# Patient Record
Sex: FEMALE | Race: WHITE | NOT HISPANIC OR LATINO | Employment: FULL TIME | RURAL
[De-identification: names, ages, dates, MRNs, and addresses within clinical notes are randomized per-mention and may not be internally consistent; named-entity substitution may affect disease eponyms.]

---

## 2021-01-25 ENCOUNTER — TELEMEDICINE (OUTPATIENT)
Dept: FAMILY MEDICINE CLINIC | Facility: CLINIC | Age: 26
End: 2021-01-25

## 2021-01-25 DIAGNOSIS — F40.9 INSOMNIA DUE TO ANXIETY AND FEAR: ICD-10-CM

## 2021-01-25 DIAGNOSIS — F51.05 INSOMNIA DUE TO ANXIETY AND FEAR: ICD-10-CM

## 2021-01-25 DIAGNOSIS — F41.8 SITUATIONAL ANXIETY: ICD-10-CM

## 2021-01-25 DIAGNOSIS — R53.83 FATIGUE, UNSPECIFIED TYPE: Primary | ICD-10-CM

## 2021-01-25 PROCEDURE — 99203 OFFICE O/P NEW LOW 30 MIN: CPT | Performed by: NURSE PRACTITIONER

## 2021-01-25 RX ORDER — BUSPIRONE HYDROCHLORIDE 15 MG/1
TABLET ORAL
Qty: 90 TABLET | Refills: 2 | Status: SHIPPED | OUTPATIENT
Start: 2021-01-25

## 2021-01-25 NOTE — PROGRESS NOTES
"Chief Complaint  Anxiety     You have chosen to receive care through a telehealth visit.  Do you consent to use a video/audio connection for your medical care today? 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  Due to the Pandemic, she is being pulled to needed departments within the hospital and it is causing significant situational anxiety.  This is further causing disturbed sleep patterns and awakening with feelings of \"fight or flight\".  She does not have a history of anxiety issues per se but did have carbon monoxide poisoning and has noted some generalized anxiety since that time.  She further notes that due to the interrupted and lack of sleep, she now is having significant fatigue to deal with as well.    Objective   Vital Signs:   There were no vitals taken for this visit.      Physical Exam   No true physical exam in this video encounter.    Result Review :                 Assessment and Plan    Problem List Items Addressed This Visit     None      Visit Diagnoses     Fatigue, unspecified type    -  Primary    Situational anxiety        Relevant Medications    busPIRone (BUSPAR) 15 MG tablet    Insomnia due to anxiety and fear        Relevant Medications    busPIRone (BUSPAR) 15 MG tablet        I spent 31 minutes caring for Michael on this date of service. This time includes time spent by me in the following activities:preparing for the visit, obtaining and/or reviewing a separately obtained history, performing a medically appropriate examination and/or evaluation , counseling and educating the patient/family/caregiver, ordering medications, tests, or procedures and documenting information in the medical record  Follow Up   Return if symptoms worsen or fail to improve.  Patient was given instructions and counseling regarding her " condition or for health maintenance advice. Please see specific information pulled into the AVS if appropriate.